# Patient Record
Sex: MALE | Race: WHITE | NOT HISPANIC OR LATINO | Employment: OTHER | URBAN - METROPOLITAN AREA
[De-identification: names, ages, dates, MRNs, and addresses within clinical notes are randomized per-mention and may not be internally consistent; named-entity substitution may affect disease eponyms.]

---

## 2021-01-01 ENCOUNTER — APPOINTMENT (OUTPATIENT)
Dept: CT IMAGING | Facility: CLINIC | Age: 86
End: 2021-01-01
Attending: EMERGENCY MEDICINE

## 2021-01-01 ENCOUNTER — HOSPITAL ENCOUNTER (EMERGENCY)
Facility: CLINIC | Age: 86
Discharge: HOME | End: 2021-10-03
Admitting: PHYSICIAN ASSISTANT

## 2021-01-01 VITALS
BODY MASS INDEX: 25.11 KG/M2 | HEART RATE: 90 BPM | HEIGHT: 67 IN | SYSTOLIC BLOOD PRESSURE: 164 MMHG | DIASTOLIC BLOOD PRESSURE: 94 MMHG | TEMPERATURE: 97.8 F | RESPIRATION RATE: 20 BRPM | WEIGHT: 160 LBS | OXYGEN SATURATION: 100 %

## 2021-01-01 DIAGNOSIS — S09.93XA FACIAL INJURY, INITIAL ENCOUNTER: ICD-10-CM

## 2021-01-01 DIAGNOSIS — Z23 NEED FOR TDAP VACCINATION: ICD-10-CM
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
## 2021-01-01 DIAGNOSIS — R04.0 EPISTAXIS: ICD-10-CM

## 2021-01-01 DIAGNOSIS — S02.401A CLOSED FRACTURE OF MAXILLARY SINUS, INITIAL ENCOUNTER (H): ICD-10-CM

## 2021-01-01 PROCEDURE — 70450 CT HEAD/BRAIN W/O DYE: CPT

## 2021-01-01 PROCEDURE — 72125 CT NECK SPINE W/O DYE: CPT

## 2021-01-01 PROCEDURE — 250N000011 HC RX IP 250 OP 636: Performed by: PHYSICIAN ASSISTANT

## 2021-01-01 PROCEDURE — 250N000009 HC RX 250: Performed by: PHYSICIAN ASSISTANT

## 2021-01-01 PROCEDURE — 70486 CT MAXILLOFACIAL W/O DYE: CPT

## 2021-01-01 PROCEDURE — 90715 TDAP VACCINE 7 YRS/> IM: CPT | Performed by: PHYSICIAN ASSISTANT

## 2021-01-01 PROCEDURE — 90471 IMMUNIZATION ADMIN: CPT | Performed by: PHYSICIAN ASSISTANT

## 2021-01-01 PROCEDURE — 99285 EMERGENCY DEPT VISIT HI MDM: CPT | Mod: 25

## 2021-01-01 RX ORDER — OXYMETAZOLINE HYDROCHLORIDE 0.05 G/100ML
2 SPRAY NASAL ONCE
Status: COMPLETED | OUTPATIENT
Start: 2021-01-01 | End: 2021-01-01

## 2021-01-01 RX ADMIN — Medication 2 SPRAY: at 13:43

## 2021-01-01 RX ADMIN — CLOSTRIDIUM TETANI TOXOID ANTIGEN (FORMALDEHYDE INACTIVATED), CORYNEBACTERIUM DIPHTHERIAE TOXOID ANTIGEN (FORMALDEHYDE INACTIVATED), BORDETELLA PERTUSSIS TOXOID ANTIGEN (GLUTARALDEHYDE INACTIVATED), BORDETELLA PERTUSSIS FILAMENTOUS HEMAGGLUTININ ANTIGEN (FORMALDEHYDE INACTIVATED), BORDETELLA PERTUSSIS PERTACTIN ANTIGEN, AND BORDETELLA PERTUSSIS FIMBRIAE 2/3 ANTIGEN 0.5 ML: 5; 2; 2.5; 5; 3; 5 INJECTION, SUSPENSION INTRAMUSCULAR at 13:44

## 2021-01-01 ASSESSMENT — ENCOUNTER SYMPTOMS
BACK PAIN: 1
NUMBNESS: 0
NECK PAIN: 0
HEADACHES: 1

## 2021-01-01 ASSESSMENT — MIFFLIN-ST. JEOR: SCORE: 1309.39

## 2021-05-26 ENCOUNTER — RECORDS - HEALTHEAST (OUTPATIENT)
Dept: ADMINISTRATIVE | Facility: CLINIC | Age: 86
End: 2021-05-26

## 2021-10-03 NOTE — ED PROVIDER NOTES
EMERGENCY DEPARTMENT ENCOUNTER      NAME: Randall Hernandez  AGE: 89 year old male  YOB: 1924  MRN: [id number]  EVALUATION DATE & TIME: 10/3/2021 12:57 PM    PCP: No primary care provider on file.    ED PROVIDER: Yoselin Espinoza PA-C      Chief Complaint   Patient presents with     Fall     Epistaxis         FINAL IMPRESSION:  1. Closed fracture of maxillary sinus, initial encounter (H)    2. Facial injury, initial encounter    3. Epistaxis    4. Fall, initial encounter    5. Need for Tdap vaccination          ED COURSE & MEDICAL DECISION MAKING:    Pertinent Labs & Imaging studies reviewed. (See chart for details)  1:05 PM I met with the patient, obtained an initial history, performed an examination and discussed the plan. PPE worn throughout all interactions with the patient, including gloves, surgical mask, N95 mask, safety glasses, and surgical cap.    1:45 PM We discussed the plan for discharge and the patient is agreeable. Reviewed supportive cares, symptomatic treatment, outpatient follow up, and reasons to return to the Emergency Department. Patient to be discharged by ED RN.     Randall Hernandez is a 89 year old male with no pertinent PMH here for evaluation after a fall.      History suggests a mechanical fall, was opening the door and someone else pushed it, causing patient to lose balance and fall.  Patient will not need an evaluation for medical causes of fall on this visit.  History and exam are concerning for the following possible injuries:      Yes  Head Injury / Concussion - Did hit face/head. No LOC. Not on anticoagulation. Ecchymosis to right cheek and epistaxis to right nostril, resolved at time of evaluation. No septal hematoma. CT facial bones shows fracture of anterior/lateral wall of right maxillary sinus with blood products in the right maxillary sinus. No occular involvement or entrapment. CT head is unremarkable, no ICH. Neurologically intact. Cervical spine notable for  chronic dens II fracture with nonunion (no reproducible cervical spine tenderness), No acute cervical spine fracture.   No  Intra-abdominal injury  No  Intra-thoracic injury  No  Spinal Fracture  No  Extremity Fracture  No  Pelvic / Hip Fracture  No  Spinal Cord injury  Yes  Laceration / wound - Abrasions to face, Tdap updated. No indication for laceration repair/sutures    Work-up notable for maxillary fracture, no occular entrapment. No indication for emergent ENT/maxillofacial consultation at this time. Epistaxis has resolved. Patient is alert, neurologically intact, ambulatory. Lives at home with daughter. Appropriate for discharge to home. Will send with prophylactic antibiotics for maxillary sinus fracture, referral for ENT and instructions for close follow up with PCP. Instructed on red flags/indications to return to the emergency department. All questions were answered to the best of my ability and patient is agreeable with plan.       MEDICATIONS GIVEN IN THE EMERGENCY:  Medications   oxymetazoline (AFRIN) 0.05 % spray 2 spray (2 sprays Nasal Given 10/3/21 1343)   Tdap (tetanus-diphtheria-acell pertussis) (ADACEL) injection 0.5 mL (0.5 mLs Intramuscular Given 10/3/21 1344)       NEW PRESCRIPTIONS STARTED AT TODAY'S ER VISIT  Discharge Medication List as of 10/3/2021  2:08 PM      START taking these medications    Details   amoxicillin-clavulanate (AUGMENTIN) 875-125 MG tablet Take 1 tablet by mouth 2 times daily for 5 days, Disp-10 tablet, R-0, Local Print                =================================================================    HPI  `  Patient information was obtained from: Patient    Use of : N/A       Randall Hernandez is a 89 year old male with a pertinent history of Drusen of macula, impacted cerumen, cardiomyopathy, paroxysmal atrial fibrillation, GERD, osteoarthritis, CKD, abdominal aortic aneurysm, anemia, CTS, pacemaker in place who presents to this ED by private car for  evaluation of fall, epistaxis.     The patient reports going to eat at [employer] this morning when a door was pushed into him and he fell over hitting his face. He reports pain over his sinus' but denies any associated loss of consciousness. He endorses a headache, facial pain, and chronic back pain. He is otherwise in his usual state of health and denies any neck pain, numbness/tingling in arms or legs, leg pain, or any other concerns at this time. `    The patient's tetanus shot is up to date with his last dose being in September of 2014.       REVIEW OF SYSTEMS   Review of Systems   HENT: Positive for nosebleeds.    Cardiovascular: Negative for leg swelling.   Musculoskeletal: Positive for back pain (chronic). Negative for neck pain.   Neurological: Positive for headaches. Negative for syncope and numbness.   All other systems reviewed and are negative.       PAST MEDICAL HISTORY:  No past medical history on file.    PAST SURGICAL HISTORY:  Past Surgical History:   Procedure Laterality Date     HC REVISE MEDIAN N/CARPAL TUNNEL SURG      Description: Neuroplasty Decompression Median Nerve At Carpal Tunnel;  Recorded: 12/14/2011;     HEMORRHOIDECTOMY INTERNAL LIGATION      Description: Hemorrhoidectomy;  Recorded: 12/14/2011;           CURRENT MEDICATIONS:    No current facility-administered medications for this encounter.     Current Outpatient Medications   Medication     amoxicillin-clavulanate (AUGMENTIN) 875-125 MG tablet         ALLERGIES:  Allergies   Allergen Reactions     Doxycycline Unknown and Photosensitivity     Prednisone      Other reaction(s): Erythema     Ranitidine Unknown     Sulfa (Sulfonamide Antibiotics) [Sulfa Drugs] Unknown     Carbamazepine Unknown and Rash       FAMILY HISTORY:  No family history on file.    SOCIAL HISTORY:   Social History     Socioeconomic History     Marital status:      Spouse name: Not on file     Number of children: Not on file     Years of education: Not on  "file     Highest education level: Not on file   Occupational History     Not on file   Tobacco Use     Smoking status: Not on file   Substance and Sexual Activity     Alcohol use: Not on file     Drug use: Not on file     Sexual activity: Not on file   Other Topics Concern     Not on file   Social History Narrative     Not on file     Social Determinants of Health     Financial Resource Strain:      Difficulty of Paying Living Expenses:    Food Insecurity:      Worried About Running Out of Food in the Last Year:      Ran Out of Food in the Last Year:    Transportation Needs:      Lack of Transportation (Medical):      Lack of Transportation (Non-Medical):    Physical Activity:      Days of Exercise per Week:      Minutes of Exercise per Session:    Stress:      Feeling of Stress :    Social Connections:      Frequency of Communication with Friends and Family:      Frequency of Social Gatherings with Friends and Family:      Attends Restoration Services:      Active Member of Clubs or Organizations:      Attends Club or Organization Meetings:      Marital Status:    Intimate Partner Violence:      Fear of Current or Ex-Partner:      Emotionally Abused:      Physically Abused:      Sexually Abused:        VITALS:  BP (!) 164/94   Pulse 90   Temp 97.8  F (36.6  C) (Temporal)   Resp 20   Ht 1.702 m (5' 7\")   Wt 72.6 kg (160 lb)   SpO2 100%   BMI 25.06 kg/m      PHYSICAL EXAM    Constitutional: Awake, alert, No acute distress.  Very hard of hearing.  HENT/Eyes: Normocephalic, abrasion and ecchymosis to right lateral cheek. No tenderness around the orbit. EOM intact with no entrapment, conjuntiva normal. Right pupil normal, left pupil irregular per baseline. Small amount of blood within the right nostril, no active bleeding. No septal hematoma. Oropharynx clear, no teeth, no evidence of intraoral trauma. No c-spine tenderness, ROM Intact.   Respiratory: Breathing easily, clear and equal breath sounds  Cardiovascular: " Regular rate and rhythm, no murmur. Pacemaker at left chest wall. No reproducible chest wall tenderness.  GI: Soft, Nontender  Musculoskeletal: Moves all extremities. No deformity.  and lower extremity strength 5/5 and symmetric. No thoracic or lumbar spine tenderness. Ambulatory with assistance per baseline.  Integument: Warm, dry, no rash  Lymphatic: No cervical lymphadenopathy  Neurologic: Alert & oriented x 3, GCS 15. Patient articulates well with no dysarthria. Distal sensation intact in upper and lower extremities. No facial asymmetry. Cranial nerves II-XII intact  Psychiatric: Affect normal, Judgment normal, Mood normal. No obvious hallucinations at this time.    LAB:  None    RADIOLOGY:  Reviewed all pertinent imaging. Please see official radiology report.  Cervical spine CT w/o contrast   Final Result   IMPRESSION:   1.  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      2.  No evidence of acute fracture of the cervical spine.      3.  Multilevel degenerative change throughout the cervical spine as outlined above.      CT Facial Bones without Contrast   Final Result   IMPRESSION:   1.  No CT evidence for acute intracranial process.   2.  Mild chronic microvascular ischemic changes as above.      3.  CT of the facial bones demonstrate fractures involving the anterior/lateral wall of the right maxillary sinus with blood products layering within the right maxillary sinus.      Head CT w/o contrast   Final Result   IMPRESSION:   1.  No CT evidence for acute intracranial process.   2.  Mild chronic microvascular ischemic changes as above.      3.  CT of the facial bones demonstrate fractures involving the anterior/lateral wall of the right maxillary sinus with blood products layering within the right maxillary sinus.          Lloyd JUAN am serving as a scribe to document services personally performed by Yoselin Espinoza PA-C, based on my observation and the provider's statements to me. Yoselin JUAN  NORIS Espinoza  attest that Lloyd Lugo is acting in a scribe capacity, has observed my performance of the services and has documented them in accordance with my direction.    Yoselin Espinoza PA-C  Emergency Medicine  [facility] EMERGENCY ROOM  [address] [address]  [phone]  Dept: [phone]     Yoselin Espinoza PA-C  10/03/21 3597

## 2021-10-03 NOTE — ED TRIAGE NOTES
Arrives to ED with c/o mechanical fall that occurred approximately 20 minutes PTA. Pt was opening door and lost balance. Fell striking R side of face on cement. Denies LOC. Pt has abrasions to bilat fingers. Pt reports approximately 5 minutes following fall began to experience epistaxis. Denies anticoagulant use. Nasal clamp applied in triage.

## 2021-10-03 NOTE — DISCHARGE INSTRUCTIONS
Use ice for pain and swelling.  Use tylenol as needed for pain.    Avoid blowing your nose. Use Afrin as needed for nosebleeds, then apply clamp and sit x 15 minutes.    Start Augmentin 2 times daily for 5 days to prevent infection with your fracture.   Your tetanus was updated with your shot.     Follow up in ENT clinic early next week for re-check. I have placed a referral. See contact information above, call clinic on Monday to schedule appointment.     Return to the emergency department if you develop vision changes/vision loss, inability to move your eye, nosebleeds not resolving with clamping, worsening pain, fevers, weakness, confusion, vomiting, or any other concerning symptoms. We would be happy to see you.